# Patient Record
(demographics unavailable — no encounter records)

---

## 2025-06-05 NOTE — HISTORY OF PRESENT ILLNESS
[FreeTextEntry1] :       61 Y/O HERE FOR CHECK UP. LMP 2013 PMHX: htn, fibroids, colpo 2011 SOCIAL:   -ETOH           -CIGG x STD:        HPV                              DISCUSSED CONDOMS x FAMILY HX OF BREAST CANCER   OVARIAN   UTERINE CA: pgm > 70,s mastectomy & tamixifen REVIEW OF SYMPTOMS DONE ALLERGIES:     Patient has answered NKDA Medication reconciliation was completed by reviewing, with the patient's involvement, the patient's current outpatient medications and those ordered for the patient today. mycardia 80/25  PE: BREASTS;- MASSES DC NODES SBE     CHAPERONE ABD SOFT NT ND NL GENIT VAGINA  -DC CX  -DC UTERUS 6-8 WEEKS  SIZE NT ADNEXA NT  -MASSES  A;P;ANNUAL EXAM,FIBROID UTERYS -PAP -SONO -FITZ -F-U AFTER ABOVE